# Patient Record
Sex: FEMALE | URBAN - METROPOLITAN AREA
[De-identification: names, ages, dates, MRNs, and addresses within clinical notes are randomized per-mention and may not be internally consistent; named-entity substitution may affect disease eponyms.]

---

## 2018-02-23 ENCOUNTER — IMPORTED ENCOUNTER (OUTPATIENT)
Dept: URBAN - METROPOLITAN AREA CLINIC 31 | Facility: CLINIC | Age: 68
End: 2018-02-23

## 2018-02-23 PROBLEM — H25.13: Noted: 2018-02-23

## 2018-02-23 PROBLEM — H40.003: Noted: 2018-02-23

## 2018-02-23 PROCEDURE — 92250 FUNDUS PHOTOGRAPHY W/I&R: CPT

## 2018-02-23 PROCEDURE — 92004 COMPRE OPH EXAM NEW PT 1/>: CPT

## 2018-02-23 NOTE — PATIENT DISCUSSION
1.  Nuclear Sclerotic Cataract OU: Explained how cataracts can effect vision. Recommend clinical observation. The patient was advised to contact us if any change or worsening of vision. 2. Glaucoma suspect OU - Hx of high IOPs. Had laser to reduce the pressure in [place]. No signs of glaucomatous damage to the optic nerve based on todays examination and testing. Will continue to monitor. 3. Return for an appointment in 1 month for cataract evaluation. with Dr. Molina Recinos.

## 2018-04-23 ENCOUNTER — IMPORTED ENCOUNTER (OUTPATIENT)
Dept: URBAN - METROPOLITAN AREA CLINIC 31 | Facility: CLINIC | Age: 68
End: 2018-04-23

## 2018-10-09 ENCOUNTER — IMPORTED ENCOUNTER (OUTPATIENT)
Dept: URBAN - METROPOLITAN AREA CLINIC 31 | Facility: CLINIC | Age: 68
End: 2018-10-09

## 2018-10-09 PROBLEM — H40.013: Noted: 2018-10-09

## 2018-10-09 PROBLEM — H43.812: Noted: 2018-10-09

## 2018-10-09 PROBLEM — H25.812: Noted: 2018-10-09

## 2018-10-09 PROBLEM — H25.11: Noted: 2018-10-09

## 2018-10-09 PROCEDURE — 99214 OFFICE O/P EST MOD 30 MIN: CPT

## 2018-10-09 PROCEDURE — 92015 DETERMINE REFRACTIVE STATE: CPT

## 2018-10-09 NOTE — PATIENT DISCUSSION
Nuclear Sclerotic Cataract OD: Discussed the risks benefits alternatives and limitations of cataract surgery including infection bleeding loss of vision retinal tears detachment. The patient stated a full understanding and a desire to proceed with the procedure in the right eye. Refractive options were reviewed. Patient has elected to be optimized for distance vision in the right eye. The patient will still need glasses for reading and to possibly fine tune distance vision.

## 2018-10-09 NOTE — PATIENT DISCUSSION
1. Combined Types of Cataract OS: Discussed the risks benefits alternatives and limitations of cataract surgery including infection bleeding loss of vision retinal tears detachment. Refractive options were reviewed. The patient stated a full understanding and a desire to proceed with the procedure in the left eye. Patient has elected to be optimized for distance vision in the left eye. The patient will still need glasses for reading and to possibly fine tune distance vision. 2. Nuclear Sclerotic Cataract OD: Discussed the risks benefits alternatives and limitations of cataract surgery including infection bleeding loss of vision retinal tears detachment. The patient stated a full understanding and a desire to proceed with the procedure in the right eye. Refractive options were reviewed. Patient has elected to be optimized for distance vision in the right eye. The patient will still need glasses for reading and to possibly fine tune distance vision. 3. PVD OS: Patient was cautioned to call our office immediately if they experience a substantial change in their symptoms such as an increase in floaters persistent flashes loss of visual field (may appear as a shadow or a curtain) or decrease in visual acuity as these may indicate a retinal tear or detachment. If this is a new problem patient will need to return for re-examination  as determined by the [address] [place] [address] 10. Glaucoma suspect OU - No signs of glaucomatous damage to the optic nerve based on todays examination and testing. Will continue to monitor. 5. Return for an appointment for [address] [place] [address] with Dr. Ariane Terrazas.  os/od

## 2018-10-12 ENCOUNTER — IMPORTED ENCOUNTER (OUTPATIENT)
Dept: URBAN - METROPOLITAN AREA CLINIC 31 | Facility: CLINIC | Age: 68
End: 2018-10-12

## 2018-10-12 PROBLEM — H25.813: Noted: 2018-10-12

## 2018-10-12 PROCEDURE — 76519 ECHO EXAM OF EYE: CPT

## 2018-10-23 ENCOUNTER — IMPORTED ENCOUNTER (OUTPATIENT)
Dept: URBAN - METROPOLITAN AREA CLINIC 31 | Facility: CLINIC | Age: 68
End: 2018-10-23

## 2018-10-23 PROBLEM — Z96.1: Noted: 2018-10-23

## 2018-10-23 PROCEDURE — 99024 POSTOP FOLLOW-UP VISIT: CPT

## 2018-10-23 NOTE — PATIENT DISCUSSION
Post-Op Day #1 - Cataract Surgery Left Eye (OS) - doing well. Tears prn. Continue postop drops as directed. Call office with symptoms of pain redness or decreased vision in operative eye.  1 drop of zylet instilledh/o glaucoma/suspect s/p SLT IOP up slightly 1 drop [place] instilled in office. Return for an appointment in 1 week for post op refraction. with Dr. Lyly Briggs.

## 2018-10-30 ENCOUNTER — IMPORTED ENCOUNTER (OUTPATIENT)
Dept: URBAN - METROPOLITAN AREA CLINIC 31 | Facility: CLINIC | Age: 68
End: 2018-10-30

## 2018-10-30 PROBLEM — Z96.1: Noted: 2018-10-30

## 2018-10-30 PROCEDURE — 99024 POSTOP FOLLOW-UP VISIT: CPT

## 2018-10-30 NOTE — PATIENT DISCUSSION
1.  Post-Op Week #1 - Cataract Surgery Left Eye (OS) -  Intraocular lens stable and surgery very well healed. Patient to resume all normal activities. Finish postop drops as directed. Final Refraction given if necessary. Call with any problems. 2. Return for an appointment in 1 week for post op exam. with Dr. Lopez Zheng. OD cataract sx 11/5 as scheduled.

## 2018-11-06 ENCOUNTER — IMPORTED ENCOUNTER (OUTPATIENT)
Dept: URBAN - METROPOLITAN AREA CLINIC 31 | Facility: CLINIC | Age: 68
End: 2018-11-06

## 2018-11-06 PROBLEM — Z96.1: Noted: 2018-11-06

## 2018-11-06 PROCEDURE — 99024 POSTOP FOLLOW-UP VISIT: CPT

## 2018-11-06 NOTE — PATIENT DISCUSSION
1.  Post-Op Week #2 - Cataract Surgery Left Eye (OS) -  Intraocular lens stable and surgery very well healed. Patient to resume all normal activities. Finish postop drops as directed. Final Refraction given if necessary. 2. Post-Op Day #1 - Cataract Surgery Right Eye (OD) - doing well. Tears prn. Continue postop drops as directed. Call office with symptoms of pain redness or decreased vision in operative eye. 1 drop zylet instilled. 3. Return for an appointment in 1 week for post op exam. with Dr. Sandra Le

## 2018-11-13 ENCOUNTER — IMPORTED ENCOUNTER (OUTPATIENT)
Dept: URBAN - METROPOLITAN AREA CLINIC 31 | Facility: CLINIC | Age: 68
End: 2018-11-13

## 2018-11-13 PROBLEM — Z96.1: Noted: 2018-11-13

## 2018-11-13 PROCEDURE — 99024 POSTOP FOLLOW-UP VISIT: CPT

## 2018-11-13 NOTE — PATIENT DISCUSSION
1.  Post-Op Week #1 - Cataract Surgery Right Eye (OD) - Intraocular lens stable and surgery very well healed. Patient to resume all normal activities. Finish postop drops as directed. Final Refraction given if necessary. Call with any problems. 2. Post-Op Cataract Surgery 15-90 days Left Eye (OS)-  Doing well with stable vision. 3. Return for an appointment in 6 months for comprehensive exam. with Dr. Sandra Underwood. 2.  Dispense trial near [facility] for OD. If likes can order. BF sun rx.

## 2022-04-01 ASSESSMENT — VISUAL ACUITY
OS_CC: 20/400
OS_CC: 20/20-2
OD_CC: 20/70
OD_SC: 20/50
OU_SC: 20/40
OU_SC: 20/50
OS_GLARE: 20/70-1MED
OD_SC: 20/50-2
OS_PH: CC 20/40
OD_CC: 20/30+2
OD_PH: CC 20/25 +1
OU_SC: 20/70
OD_PH: CC 20/30 -2
OD_CC: 20/20
OD_GLARE: 20/50MED
OD_SC: 20/200
OS_SC: 20/100
OS_CC: 20/20
OD_SC: 20/50
OS_SC: 20/70-1
OS_CC: 20/20
OS_CC: 20/30-2

## 2022-04-01 ASSESSMENT — TONOMETRY
OD_IOP_MMHG: 14
OS_IOP_MMHG: 16
OD_IOP_MMHG: 16
OS_IOP_MMHG: 17
OS_IOP_MMHG: 16
OD_IOP_MMHG: 21
OS_IOP_MMHG: 16
OS_IOP_MMHG: 17
OD_IOP_MMHG: 16
OD_IOP_MMHG: 21
OS_IOP_MMHG: 22

## 2022-07-09 ENCOUNTER — TELEPHONE ENCOUNTER (OUTPATIENT)
Dept: URBAN - METROPOLITAN AREA CLINIC 121 | Facility: CLINIC | Age: 72
End: 2022-07-09

## 2022-07-10 ENCOUNTER — TELEPHONE ENCOUNTER (OUTPATIENT)
Dept: URBAN - METROPOLITAN AREA CLINIC 121 | Facility: CLINIC | Age: 72
End: 2022-07-10

## 2022-07-10 RX ORDER — TRETINOIN 0.5 MG/G
CREAM TOPICAL TWICE A DAY
Refills: 1 | Status: ACTIVE | COMMUNITY
Start: 2011-10-24

## 2022-07-10 RX ORDER — TRIAMTERENE AND HYDROCHLOROTHIAZIDE 37.5; 25 MG/1; MG/1
TABLET ORAL
Refills: 0 | Status: ACTIVE | COMMUNITY
Start: 2011-10-24

## 2022-07-10 RX ORDER — GABAPENTIN 600 MG/1
TABLET ORAL
Refills: 0 | Status: ACTIVE | COMMUNITY
Start: 2011-10-24

## 2022-07-10 RX ORDER — SIMVASTATIN 20 MG/1
TABLET, FILM COATED ORAL
Refills: 0 | Status: ACTIVE | COMMUNITY
Start: 2011-10-24

## 2022-07-10 RX ORDER — BUPROPION HYDROCHLORIDE 150 MG/1
TABLET, FILM COATED, EXTENDED RELEASE ORAL
Refills: 0 | Status: ACTIVE | COMMUNITY
Start: 2011-10-24

## 2023-02-02 ENCOUNTER — WEB ENCOUNTER (OUTPATIENT)
Dept: URBAN - METROPOLITAN AREA CLINIC 9 | Facility: CLINIC | Age: 73
End: 2023-02-02

## 2023-02-02 ENCOUNTER — OFFICE VISIT (OUTPATIENT)
Dept: URBAN - METROPOLITAN AREA CLINIC 9 | Facility: CLINIC | Age: 73
End: 2023-02-02
Payer: MEDICARE

## 2023-02-02 VITALS
SYSTOLIC BLOOD PRESSURE: 128 MMHG | WEIGHT: 217 LBS | BODY MASS INDEX: 36.15 KG/M2 | DIASTOLIC BLOOD PRESSURE: 84 MMHG | HEIGHT: 65 IN

## 2023-02-02 DIAGNOSIS — R14.0 BLOAT: ICD-10-CM

## 2023-02-02 DIAGNOSIS — K62.5 RECTAL BLEED: ICD-10-CM

## 2023-02-02 DIAGNOSIS — R15.9 FECAL INCONTINENCE: ICD-10-CM

## 2023-02-02 DIAGNOSIS — K57.30 DIVERTICULA OF COLON: ICD-10-CM

## 2023-02-02 DIAGNOSIS — R19.8 CHANGE IN BOWEL FUNCTION: ICD-10-CM

## 2023-02-02 DIAGNOSIS — A04.72 C. DIFFICILE COLITIS: ICD-10-CM

## 2023-02-02 PROCEDURE — 99204 OFFICE O/P NEW MOD 45 MIN: CPT | Performed by: INTERNAL MEDICINE

## 2023-02-02 RX ORDER — TRETINOIN 0.5 MG/G
CREAM TOPICAL TWICE A DAY
Refills: 1 | Status: DISCONTINUED | COMMUNITY
Start: 2011-10-24

## 2023-02-02 RX ORDER — GABAPENTIN 600 MG/1
TABLET ORAL
Refills: 0 | Status: DISCONTINUED | COMMUNITY
Start: 2011-10-24

## 2023-02-02 RX ORDER — SODIUM, POTASSIUM,MAG SULFATES 17.5-3.13G
177ML SOLUTION, RECONSTITUTED, ORAL ORAL ONCE
Qty: 1 | Refills: 0 | OUTPATIENT
Start: 2023-02-02 | End: 2023-02-03

## 2023-02-02 RX ORDER — VALSARTAN AND HYDROCHLOROTHIAZIDE 80; 12.5 MG/1; MG/1
1 TABLET TABLET, FILM COATED ORAL ONCE A DAY
Status: ACTIVE | COMMUNITY

## 2023-02-02 RX ORDER — SIMVASTATIN 20 MG/1
TABLET, FILM COATED ORAL
Refills: 0 | Status: DISCONTINUED | COMMUNITY
Start: 2011-10-24

## 2023-02-02 RX ORDER — TRIAMTERENE AND HYDROCHLOROTHIAZIDE 37.5; 25 MG/1; MG/1
TABLET ORAL
Refills: 0 | Status: DISCONTINUED | COMMUNITY
Start: 2011-10-24

## 2023-02-02 RX ORDER — NICOTINE 14MG/24HR
AS DIRECTED PATCH, TRANSDERMAL 24 HOURS TRANSDERMAL
Status: ACTIVE | COMMUNITY
Start: 2023-02-02

## 2023-02-02 RX ORDER — ATORVASTATIN CALCIUM 10 MG/1
1 TABLET TABLET, FILM COATED ORAL ONCE A DAY
Status: ACTIVE | COMMUNITY

## 2023-02-02 RX ORDER — LORATADINE 10 MG/1
1 TABLET TABLET ORAL ONCE A DAY
Qty: 30 | Status: ACTIVE | COMMUNITY
Start: 2023-02-02 | End: 2023-03-04

## 2023-02-02 RX ORDER — BUPROPION HYDROCHLORIDE 150 MG/1
TABLET, FILM COATED, EXTENDED RELEASE ORAL
Refills: 0 | Status: DISCONTINUED | COMMUNITY
Start: 2011-10-24

## 2023-02-02 NOTE — HPI-TODAY'S VISIT:
72-year-old patient comes in with C. difficile.  We have a positive toxin test from November.  He said she started having a change in bowel habits in October where she was getting mucus in her stool.  She was also put on antibiotics for UTI and started having urgency diarrhea fecal incontinence and was found to have C. difficile.  She was treated for that but since that time is still had a persistent change in bowel habits since he still sees mucus and a little bright red blood sometimes.  She also has multiple bowel movements a day which is different than her usual which is 1 formed stool a day.  And the stool is loose.  Also has a lot of bloating.  Denies family history of GI issues.  In 2016 she had a colonoscopy and there was some area of inflammation.  She had a subsequent month that did not show this.  She had an elevated fecal calprotectin of 266 at the time of the C. 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

## 2023-02-03 ENCOUNTER — TELEPHONE ENCOUNTER (OUTPATIENT)
Dept: URBAN - METROPOLITAN AREA CLINIC 9 | Facility: CLINIC | Age: 73
End: 2023-02-03

## 2023-02-03 RX ORDER — SODIUM, POTASSIUM,MAG SULFATES 17.5-3.13G
177ML SOLUTION, RECONSTITUTED, ORAL ORAL ONCE
Qty: 1 | Refills: 0 | COMMUNITY
Start: 2023-02-02 | End: 2023-02-03

## 2023-02-03 RX ORDER — NICOTINE 14MG/24HR
AS DIRECTED PATCH, TRANSDERMAL 24 HOURS TRANSDERMAL
COMMUNITY
Start: 2023-02-02

## 2023-02-03 RX ORDER — VALSARTAN AND HYDROCHLOROTHIAZIDE 80; 12.5 MG/1; MG/1
1 TABLET TABLET, FILM COATED ORAL ONCE A DAY
COMMUNITY

## 2023-02-03 RX ORDER — ATORVASTATIN CALCIUM 10 MG/1
1 TABLET TABLET, FILM COATED ORAL ONCE A DAY
COMMUNITY

## 2023-02-03 RX ORDER — LORATADINE 10 MG/1
1 TABLET TABLET ORAL ONCE A DAY
Qty: 30 | COMMUNITY
Start: 2023-02-02 | End: 2023-03-04

## 2023-02-07 ENCOUNTER — CLAIMS CREATED FROM THE CLAIM WINDOW (OUTPATIENT)
Dept: URBAN - METROPOLITAN AREA CLINIC 4 | Facility: CLINIC | Age: 73
End: 2023-02-07
Payer: MEDICARE

## 2023-02-07 ENCOUNTER — OFFICE VISIT (OUTPATIENT)
Dept: URBAN - METROPOLITAN AREA SURGERY CENTER 9 | Facility: SURGERY CENTER | Age: 73
End: 2023-02-07
Payer: MEDICARE

## 2023-02-07 DIAGNOSIS — K64.8 EXTERNAL HEMORRHOIDS: ICD-10-CM

## 2023-02-07 DIAGNOSIS — K57.30 ACQUIRED DIVERTICULOSIS OF COLON: ICD-10-CM

## 2023-02-07 DIAGNOSIS — R19.4 ALTERATION IN BOWEL ELIMINATION: ICD-10-CM

## 2023-02-07 DIAGNOSIS — K63.5 BENIGN COLON POLYP: ICD-10-CM

## 2023-02-07 DIAGNOSIS — K63.89 OTHER SPECIFIED DISEASES OF INTESTINE: ICD-10-CM

## 2023-02-07 PROCEDURE — 45380 COLONOSCOPY AND BIOPSY: CPT | Performed by: INTERNAL MEDICINE

## 2023-02-07 PROCEDURE — 45385 COLONOSCOPY W/LESION REMOVAL: CPT | Performed by: CLINIC/CENTER

## 2023-02-07 PROCEDURE — 88305 TISSUE EXAM BY PATHOLOGIST: CPT | Performed by: PATHOLOGY

## 2023-02-07 PROCEDURE — 45380 COLONOSCOPY AND BIOPSY: CPT | Performed by: CLINIC/CENTER

## 2023-02-07 PROCEDURE — 45385 COLONOSCOPY W/LESION REMOVAL: CPT | Performed by: INTERNAL MEDICINE

## 2023-02-07 RX ORDER — LORATADINE 10 MG/1
1 TABLET TABLET ORAL ONCE A DAY
Qty: 30 | COMMUNITY
Start: 2023-02-02 | End: 2023-03-04

## 2023-02-07 RX ORDER — NICOTINE 14MG/24HR
AS DIRECTED PATCH, TRANSDERMAL 24 HOURS TRANSDERMAL
COMMUNITY
Start: 2023-02-02

## 2023-02-07 RX ORDER — ATORVASTATIN CALCIUM 10 MG/1
1 TABLET TABLET, FILM COATED ORAL ONCE A DAY
COMMUNITY

## 2023-02-07 RX ORDER — VALSARTAN AND HYDROCHLOROTHIAZIDE 80; 12.5 MG/1; MG/1
1 TABLET TABLET, FILM COATED ORAL ONCE A DAY
COMMUNITY

## 2023-02-20 ENCOUNTER — TELEPHONE ENCOUNTER (OUTPATIENT)
Dept: URBAN - METROPOLITAN AREA CLINIC 9 | Facility: CLINIC | Age: 73
End: 2023-02-20

## 2023-03-07 ENCOUNTER — OFFICE VISIT (OUTPATIENT)
Dept: URBAN - METROPOLITAN AREA CLINIC 9 | Facility: CLINIC | Age: 73
End: 2023-03-07
Payer: MEDICARE

## 2023-03-07 ENCOUNTER — DASHBOARD ENCOUNTERS (OUTPATIENT)
Age: 73
End: 2023-03-07

## 2023-03-07 VITALS
HEIGHT: 65 IN | DIASTOLIC BLOOD PRESSURE: 80 MMHG | SYSTOLIC BLOOD PRESSURE: 130 MMHG | WEIGHT: 218 LBS | BODY MASS INDEX: 36.32 KG/M2

## 2023-03-07 DIAGNOSIS — R19.8 CHANGE IN BOWEL FUNCTION: ICD-10-CM

## 2023-03-07 DIAGNOSIS — K63.5 COLON POLYP: ICD-10-CM

## 2023-03-07 DIAGNOSIS — K62.5 RECTAL BLEED: ICD-10-CM

## 2023-03-07 DIAGNOSIS — R14.0 BLOAT: ICD-10-CM

## 2023-03-07 DIAGNOSIS — A04.72 C. DIFFICILE COLITIS: ICD-10-CM

## 2023-03-07 DIAGNOSIS — R15.9 FECAL INCONTINENCE: ICD-10-CM

## 2023-03-07 DIAGNOSIS — K57.30 DIVERTICULA OF COLON: ICD-10-CM

## 2023-03-07 PROBLEM — 398050005 DIVERTICULAR DISEASE OF COLON: Status: ACTIVE | Noted: 2023-02-02

## 2023-03-07 PROBLEM — 72042002 BOWEL INCONTINENCE: Status: ACTIVE | Noted: 2023-02-02

## 2023-03-07 PROCEDURE — 99214 OFFICE O/P EST MOD 30 MIN: CPT | Performed by: INTERNAL MEDICINE

## 2023-03-07 RX ORDER — NICOTINE 14MG/24HR
AS DIRECTED PATCH, TRANSDERMAL 24 HOURS TRANSDERMAL
COMMUNITY
Start: 2023-02-02

## 2023-03-07 RX ORDER — ATORVASTATIN CALCIUM 10 MG/1
1 TABLET TABLET, FILM COATED ORAL ONCE A DAY
Status: ACTIVE | COMMUNITY

## 2023-03-07 RX ORDER — LORATADINE 10 MG/1
1 TABLET TABLET ORAL ONCE A DAY
Status: ACTIVE | COMMUNITY

## 2023-03-07 RX ORDER — VALSARTAN AND HYDROCHLOROTHIAZIDE 80; 12.5 MG/1; MG/1
1 TABLET TABLET, FILM COATED ORAL ONCE A DAY
Status: ACTIVE | COMMUNITY

## 2023-03-07 NOTE — HPI-TODAY'S VISIT:
72-year-old patient comes in with C. difficile.  We have a positive toxin test from November.  He said she started having a change in bowel habits in October where she was getting mucus in her stool.  She was also put on antibiotics for UTI and started having urgency diarrhea fecal incontinence and was found to have C. difficile.  She was treated for that but since that time is still had a persistent change in bowel habits since he still sees mucus and a little bright red blood sometimes.  She also has multiple bowel movements a day which is different than her usual which is 1 formed stool a day.  And the stool is loose.  Also has a lot of bloating.  Denies family history of GI issues.  In 2016 she had a colonoscopy and there was some area of inflammation.  She had a subsequent month that did not show this.  She had an elevated fecal calprotectin of 266 at the time of the C. 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 Colonoscopy showed one small polyp which was a ta and one benign polyp She is here for follow-up today.  We went over her colonoscopy findings.  She has been on the Benefiber twice a day and since that time all of her symptoms have resolved

## 2023-05-03 ENCOUNTER — ERX REFILL RESPONSE (OUTPATIENT)
Dept: URBAN - METROPOLITAN AREA CLINIC 9 | Facility: CLINIC | Age: 73
End: 2023-05-03

## 2023-05-03 RX ORDER — DICYCLOMINE HYDROCHLORIDE 10 MG/1
TAKE 2 CAPSULES BY MOUTH 3 TIMES A DAY AS NEEDED CAPSULE ORAL
Qty: 270 CAPSULE | Refills: 1 | OUTPATIENT